# Patient Record
Sex: MALE | Race: WHITE | Employment: OTHER | URBAN - METROPOLITAN AREA
[De-identification: names, ages, dates, MRNs, and addresses within clinical notes are randomized per-mention and may not be internally consistent; named-entity substitution may affect disease eponyms.]

---

## 2017-10-13 ENCOUNTER — HOSPITAL ENCOUNTER (INPATIENT)
Age: 52
LOS: 2 days | Discharge: HOME | DRG: 392 | End: 2017-10-15
Attending: EMERGENCY MEDICINE | Admitting: FAMILY MEDICINE
Payer: COMMERCIAL

## 2017-10-13 DIAGNOSIS — K44.9 HIATAL HERNIA: ICD-10-CM

## 2017-10-13 DIAGNOSIS — K57.20 DIVERTICULITIS OF LARGE INTESTINE WITH ABSCESS, UNSPECIFIED BLEEDING STATUS: Primary | ICD-10-CM

## 2017-10-13 DIAGNOSIS — K76.0 HEPATIC STEATOSIS: ICD-10-CM

## 2017-10-13 DIAGNOSIS — R10.9 ABDOMINAL PAIN, UNSPECIFIED ABDOMINAL LOCATION: ICD-10-CM

## 2017-10-13 LAB
ALBUMIN SERPL-MCNC: 3.9 G/DL (ref 3.4–5)
ALBUMIN/GLOB SERPL: 1.3 {RATIO} (ref 0.8–1.7)
ALP SERPL-CCNC: 91 U/L (ref 45–117)
ALT SERPL-CCNC: 24 U/L (ref 16–61)
ANION GAP SERPL CALC-SCNC: 5 MMOL/L (ref 3–18)
APPEARANCE UR: CLEAR
AST SERPL-CCNC: 16 U/L (ref 15–37)
BASOPHILS # BLD: 0.1 K/UL (ref 0–0.06)
BASOPHILS NFR BLD: 1 % (ref 0–2)
BILIRUB DIRECT SERPL-MCNC: 0.1 MG/DL (ref 0–0.2)
BILIRUB SERPL-MCNC: 0.4 MG/DL (ref 0.2–1)
BILIRUB UR QL: NEGATIVE
BUN SERPL-MCNC: 11 MG/DL (ref 7–18)
BUN/CREAT SERPL: 11 (ref 12–20)
CALCIUM SERPL-MCNC: 9.4 MG/DL (ref 8.5–10.1)
CHLORIDE SERPL-SCNC: 103 MMOL/L (ref 100–108)
CO2 SERPL-SCNC: 29 MMOL/L (ref 21–32)
COLOR UR: YELLOW
CREAT SERPL-MCNC: 1 MG/DL (ref 0.6–1.3)
DIFFERENTIAL METHOD BLD: ABNORMAL
EOSINOPHIL # BLD: 0.2 K/UL (ref 0–0.4)
EOSINOPHIL NFR BLD: 2 % (ref 0–5)
ERYTHROCYTE [DISTWIDTH] IN BLOOD BY AUTOMATED COUNT: 12.1 % (ref 11.6–14.5)
GLOBULIN SER CALC-MCNC: 3 G/DL (ref 2–4)
GLUCOSE SERPL-MCNC: 114 MG/DL (ref 74–99)
GLUCOSE UR STRIP.AUTO-MCNC: NEGATIVE MG/DL
HCT VFR BLD AUTO: 39.4 % (ref 36–48)
HGB BLD-MCNC: 13.7 G/DL (ref 13–16)
HGB UR QL STRIP: NEGATIVE
KETONES UR QL STRIP.AUTO: NEGATIVE MG/DL
LEUKOCYTE ESTERASE UR QL STRIP.AUTO: NEGATIVE
LIPASE SERPL-CCNC: 74 U/L (ref 73–393)
LYMPHOCYTES # BLD: 2.1 K/UL (ref 0.9–3.6)
LYMPHOCYTES NFR BLD: 26 % (ref 21–52)
MCH RBC QN AUTO: 31.8 PG (ref 24–34)
MCHC RBC AUTO-ENTMCNC: 34.8 G/DL (ref 31–37)
MCV RBC AUTO: 91.4 FL (ref 74–97)
MONOCYTES # BLD: 0.7 K/UL (ref 0.05–1.2)
MONOCYTES NFR BLD: 9 % (ref 3–10)
NEUTS SEG # BLD: 5 K/UL (ref 1.8–8)
NEUTS SEG NFR BLD: 62 % (ref 40–73)
NITRITE UR QL STRIP.AUTO: NEGATIVE
PH UR STRIP: 6 [PH] (ref 5–8)
PLATELET # BLD AUTO: 219 K/UL (ref 135–420)
PMV BLD AUTO: 10.1 FL (ref 9.2–11.8)
POTASSIUM SERPL-SCNC: 3.7 MMOL/L (ref 3.5–5.5)
PROT SERPL-MCNC: 6.9 G/DL (ref 6.4–8.2)
PROT UR STRIP-MCNC: NEGATIVE MG/DL
RBC # BLD AUTO: 4.31 M/UL (ref 4.7–5.5)
SODIUM SERPL-SCNC: 137 MMOL/L (ref 136–145)
SP GR UR REFRACTOMETRY: 1.02 (ref 1–1.03)
UROBILINOGEN UR QL STRIP.AUTO: 0.2 EU/DL (ref 0.2–1)
WBC # BLD AUTO: 8.1 K/UL (ref 4.6–13.2)

## 2017-10-13 PROCEDURE — 94761 N-INVAS EAR/PLS OXIMETRY MLT: CPT

## 2017-10-13 PROCEDURE — 87040 BLOOD CULTURE FOR BACTERIA: CPT | Performed by: NURSE PRACTITIONER

## 2017-10-13 PROCEDURE — 74011250636 HC RX REV CODE- 250/636: Performed by: NURSE PRACTITIONER

## 2017-10-13 PROCEDURE — 74011000258 HC RX REV CODE- 258: Performed by: NURSE PRACTITIONER

## 2017-10-13 PROCEDURE — 74011250636 HC RX REV CODE- 250/636: Performed by: FAMILY MEDICINE

## 2017-10-13 PROCEDURE — 80048 BASIC METABOLIC PNL TOTAL CA: CPT | Performed by: NURSE PRACTITIONER

## 2017-10-13 PROCEDURE — 85025 COMPLETE CBC W/AUTO DIFF WBC: CPT | Performed by: NURSE PRACTITIONER

## 2017-10-13 PROCEDURE — 83690 ASSAY OF LIPASE: CPT | Performed by: NURSE PRACTITIONER

## 2017-10-13 PROCEDURE — 65270000029 HC RM PRIVATE

## 2017-10-13 PROCEDURE — 81003 URINALYSIS AUTO W/O SCOPE: CPT | Performed by: NURSE PRACTITIONER

## 2017-10-13 PROCEDURE — 99284 EMERGENCY DEPT VISIT MOD MDM: CPT

## 2017-10-13 PROCEDURE — 80076 HEPATIC FUNCTION PANEL: CPT | Performed by: NURSE PRACTITIONER

## 2017-10-13 PROCEDURE — 96374 THER/PROPH/DIAG INJ IV PUSH: CPT

## 2017-10-13 RX ORDER — SODIUM CHLORIDE 9 MG/ML
125 INJECTION, SOLUTION INTRAVENOUS CONTINUOUS
Status: DISCONTINUED | OUTPATIENT
Start: 2017-10-13 | End: 2017-10-13

## 2017-10-13 RX ORDER — SODIUM CHLORIDE 9 MG/ML
100 INJECTION, SOLUTION INTRAVENOUS CONTINUOUS
Status: DISCONTINUED | OUTPATIENT
Start: 2017-10-13 | End: 2017-10-15 | Stop reason: HOSPADM

## 2017-10-13 RX ORDER — HEPARIN SODIUM 5000 [USP'U]/ML
5000 INJECTION, SOLUTION INTRAVENOUS; SUBCUTANEOUS EVERY 8 HOURS
Status: DISCONTINUED | OUTPATIENT
Start: 2017-10-13 | End: 2017-10-15 | Stop reason: HOSPADM

## 2017-10-13 RX ORDER — SODIUM CHLORIDE 0.9 % (FLUSH) 0.9 %
5-10 SYRINGE (ML) INJECTION AS NEEDED
Status: DISCONTINUED | OUTPATIENT
Start: 2017-10-13 | End: 2017-10-15 | Stop reason: HOSPADM

## 2017-10-13 RX ORDER — MORPHINE SULFATE 2 MG/ML
2 INJECTION, SOLUTION INTRAMUSCULAR; INTRAVENOUS
Status: DISCONTINUED | OUTPATIENT
Start: 2017-10-13 | End: 2017-10-15 | Stop reason: HOSPADM

## 2017-10-13 RX ORDER — SODIUM CHLORIDE 0.9 % (FLUSH) 0.9 %
5-10 SYRINGE (ML) INJECTION EVERY 8 HOURS
Status: DISCONTINUED | OUTPATIENT
Start: 2017-10-13 | End: 2017-10-13

## 2017-10-13 RX ORDER — ONDANSETRON 2 MG/ML
4 INJECTION INTRAMUSCULAR; INTRAVENOUS
Status: DISCONTINUED | OUTPATIENT
Start: 2017-10-13 | End: 2017-10-15 | Stop reason: HOSPADM

## 2017-10-13 RX ADMIN — SODIUM CHLORIDE 125 ML/HR: 900 INJECTION, SOLUTION INTRAVENOUS at 21:05

## 2017-10-13 RX ADMIN — PIPERACILLIN SODIUM,TAZOBACTAM SODIUM 3.38 G: 3; .375 INJECTION, POWDER, FOR SOLUTION INTRAVENOUS at 19:32

## 2017-10-13 RX ADMIN — SODIUM CHLORIDE 100 ML/HR: 900 INJECTION, SOLUTION INTRAVENOUS at 22:00

## 2017-10-13 NOTE — Clinical Note
Status[de-identified] Inpatient [101] Type of Bed: Medical [8] Inpatient Hospitalization Certified Necessary for the Following Reasons: 3. Patient receiving treatment that can only be provided in an inpatient setting (further clarification in H&P documentation) Admitting Diagnosis: Diverticulitis of large intestine with abscess without bleeding [[id number]] Admitting Physician: Wander Pineda Attending Physician: Wander Pineda Estimated Length of Stay: 2 Midnights Discharge Plan[de-identified] Home with Office Follow-up

## 2017-10-13 NOTE — ED PROVIDER NOTES
HPI Comments: `Alec Marques is a 47year old male who was sent to the ED for admission by Dr Douglas Corbin. The Pt was seen by Dr Douglas Corbin yesterday, and had a CT of the abdomen done on MRI and CT diagnostics of [place], [place]. Dr Douglas Corbin called him and advised the Pt to come to the ER for admission and IV ABX. Dr Douglas Corbin also spoke with Dr Rosalia Cristobal, advising him that that the Pt has diverticulitis, and a small abscess. He requested the Pt be admitted through the hospital service. Patient is a 47 y.o. male presenting with abdominal pain. The history is provided by the patient, medical records and the spouse. History limited by: No communication barrier. Abdominal Pain    This is a new problem. Episode onset: Sinday Oct 08, 2017. The problem occurs constantly. The problem has not changed since onset. Associated with: Pt makes an association with eating peanuts. The pain is located in the generalized abdominal region. The pain is mild. Nothing worsens the pain. The pain is relieved by nothing. History reviewed. No pertinent past medical history. History reviewed. No pertinent surgical history. History reviewed. No pertinent family history. Social History     Social History    Marital status:      Spouse name: N/A    Number of children: N/A    Years of education: N/A     Occupational History    Not on file. Social History Main Topics    Smoking status: Not on file    Smokeless tobacco: Not on file    Alcohol use Not on file    Drug use: Not on file    Sexual activity: Not on file     Other Topics Concern    Not on file     Social History Narrative    No narrative on file         ALLERGIES: Review of patient's allergies indicates no known allergies. Review of Systems   Constitutional: Negative. HENT: Negative. Eyes: Negative. Respiratory: Negative. Cardiovascular: Negative. Gastrointestinal: Positive for abdominal pain. Endocrine: Negative.     Genitourinary: Negative. Musculoskeletal: Negative. Skin: Negative. Allergic/Immunologic: Negative. Neurological: Negative. Psychiatric/Behavioral: Negative. Vitals:    10/13/17 1824 10/13/17 1830   BP: 110/69 121/72   Pulse:  67   Resp:  17   SpO2: 94% 96%            Physical Exam   Constitutional: He is oriented to person, place, and time. He appears well-developed and well-nourished. No distress. HENT:   Head: Normocephalic and atraumatic. Eyes: Pupils are equal, round, and reactive to light. Neck: Normal range of motion. Neck supple. Cardiovascular: Normal rate, regular rhythm, normal heart sounds and intact distal pulses. Pulmonary/Chest: Effort normal and breath sounds normal. He has no wheezes. He has no rales. Abdominal: Soft. Bowel sounds are normal. He exhibits distension. There is no tenderness. There is no rebound and no guarding. Genitourinary:   Genitourinary Comments: NE   Musculoskeletal: Normal range of motion. Neurological: He is alert and oriented to person, place, and time. No cranial nerve deficit. He exhibits normal muscle tone. Coordination normal.   Skin: Skin is warm and dry. Psychiatric: He has a normal mood and affect. Nursing note and vitals reviewed. MDM  Number of Diagnoses or Management Options  Abdominal pain, unspecified abdominal location:   Diverticulitis of large intestine with abscess, unspecified bleeding status:   Hepatic steatosis:   Hiatal hernia:      Amount and/or Complexity of Data Reviewed  Clinical lab tests: ordered and reviewed  Review and summarize past medical records: yes  Discuss the patient with other providers: yes (Dr Maricruz Shaikh, Dr Neyda Sunshine, Dr Magaly Rooney.  )    Risk of Complications, Morbidity, and/or Mortality  Presenting problems: moderate  Diagnostic procedures: moderate  Management options: moderate    Patient Progress  Patient progress: stable    ED Course       Procedures     CONSULT:  Discussed the Pt with Dr Neyda Sunshine.   She advised admission and requested she be placed on the consulting service. Spoke with Dr Traci Mata, who accepted the Pt for admission. Lissett Pierce NP   8:11 PM    Diagnosis:   1. Diverticulitis of large intestine with abscess, unspecified bleeding status    2. Abdominal pain, unspecified abdominal location    3. Hepatic steatosis    4. Hiatal hernia          Disposition:   ADMITTED - DR DEJESUS.      Follow-up Information     None          Patient's Medications    No medications on file

## 2017-10-13 NOTE — IP AVS SNAPSHOT
[address] [address] 
[phone] Patient: Vinod Wells MRN: [id number] :1965 Current Discharge Medication List  
  
START taking these medications Dose & Instructions Dispensing Information Comments Morning Noon Evening Bedtime  
 ciprofloxacin HCl 500 mg tablet Commonly known as:  CIPRO Your last dose was: Your next dose is:    
   
   
 Dose:  500 mg Take 1 Tab by mouth two (2) times a day for 10 days. Quantity:  20 Tab Refills:  0  
     
   
   
   
  
 metroNIDAZOLE 500 mg tablet Commonly known as:  FLAGYL Your last dose was: Your next dose is:    
   
   
 Dose:  500 mg Take 1 Tab by mouth two (2) times a day for 10 days. Quantity:  20 Tab Refills:  0 Where to Get Your Medications Information on where to get these meds will be given to you by the nurse or doctor. ! Ask your nurse or doctor about these medications  
  ciprofloxacin HCl 500 mg tablet  
 metroNIDAZOLE 500 mg tablet

## 2017-10-13 NOTE — IP AVS SNAPSHOT
[address] [place] [address] 
[phone] Patient: Kendy Meyers MRN: [id number] :1965 You are allergic to the following No active allergies Immunizations Administered for This Admission Name Date Influenza Vaccine  Deferred (Patient Refused) Recent Documentation Height Weight BMI  
  
  
 1.803 m 103.3 kg 31.77 kg/m2 Unresulted Labs Order Current Status CULTURE, BLOOD Preliminary result CULTURE, BLOOD Preliminary result Emergency Contacts Name Discharge Info Relation Home Work Mobile Magda Little DISCHARGE CAREGIVER [3] Spouse [3]   [phone] About your hospitalization You were admitted on:  2017 You last received care in the:  [facility] 2 NEURO MED You were discharged on:  October 15, 2017 Unit phone number:  [phone] Why you were hospitalized Your primary diagnosis was:  Diverticulitis Of Large Intestine With Abscess Without Bleeding Providers Seen During Your Hospitalizations Provider Role Specialty Primary office phone Alisson Chao MD Attending Provider Emergency Medicine [phone] Pat Batista MD Attending Provider [facility] [phone] Jes Smith DO Attending Provider Internal Medicine [phone] Your Primary Care Physician (PCP) Primary Care Physician Office Phone Office Fax [place] [phone] Follow-up Information Follow up With Details Comments Contact Info Additional Information Crispin Gifford MD Schedule an appointment as soon as possible for a visit CT abd 10/19. Colonoscopy 6-8 weeks [address] [address] [phone] Tonya Tian MD   [address] 
[phone] [facility] RADIOLOGY   [address] [phone]  CHECK IN INSTRUCTIONS  For Diagnostic Radiology/XRAY exams:  Check in to Patient Access at the [address] 30 minutes prior to your appointment. NOTE:  FREE  Parking is available at the [address] [place] [address]                                                                     [facility]ish[facility] Row[facility] Current Discharge Medication List  
  
START taking these medications Dose & Instructions Dispensing Information Comments Morning Noon Evening Bedtime  
 ciprofloxacin HCl 500 mg tablet Commonly known as:  CIPRO Your last dose was: Your next dose is:    
   
   
 Dose:  500 mg Take 1 Tab by mouth two (2) times a day for 10 days. Quantity:  20 Tab Refills:  0  
     
   
   
   
  
 metroNIDAZOLE 500 mg tablet Commonly known as:  FLAGYL Your last dose was: Your next dose is:    
   
   
 Dose:  500 mg Take 1 Tab by mouth two (2) times a day for 10 days. Quantity:  20 Tab Refills:  0 Where to Get Your Medications Information on where to get these meds will be given to you by the nurse or doctor. ! Ask your nurse or doctor about these medications  
  ciprofloxacin HCl 500 mg tablet  
 metroNIDAZOLE 500 mg tablet Discharge Instructions Patient armband removed and shredded. DISCHARGE SUMMARY from Nurse The following personal items are in your possession at time of discharge: 
 
Dental Appliances: None Visual Aid: None Home Medications: None Jewelry: None Clothing: Shirt Other Valuables: Cell Phone PATIENT INSTRUCTIONS: 
 
 
F-face looks uneven A-arms unable to move or move unevenly S-speech slurred or non-existent T-time-call 911 as soon as signs and symptoms begin-DO NOT go Back to bed or wait to see if you get better-TIME IS BRAIN. Warning Signs of HEART ATTACK Call 911 if you have these symptoms: 
? Chest discomfort. Most heart attacks involve discomfort in the center of the chest that lasts more than a few minutes, or that goes away and comes back. It can feel like uncomfortable pressure, squeezing, fullness, or pain. ? Discomfort in other areas of the upper body. Symptoms can include pain or discomfort in one or both arms, the back, neck, jaw, or stomach. ? Shortness of breath with or without chest discomfort. ? Other signs may include breaking out in a cold sweat, nausea, or lightheadedness. Don't wait more than five minutes to call [address]! Fast action can save your life. Calling 911 is almost always the fastest way to get lifesaving treatment. Emergency Medical Services staff can begin treatment when they arrive  up to an hour sooner than if someone gets to the hospital by car. The discharge information has been reviewed with the patient. The patient verbalized understanding. Discharge medications reviewed with the patient and appropriate educational materials and side effects teaching were provided. Learning About Diverticulosis and Diverticulitis What are diverticulosis and diverticulitis? In diverticulosis and diverticulitis, pouches called diverticula form in the wall of the large intestine, or colon. · In diverticulosis, the pouches do not cause any pain or other symptoms. · In diverticulitis, the pouches get inflamed or infected and cause symptoms. Doctors aren't sure what causes these pouches in the colon. But they think that a low-fiber diet may play a role. Without fiber to add bulk to the stool, the colon has to work harder than normal to push the stool forward. The pressure from this may cause pouches to form in weak spots along the colon. Some people with diverticulosis get diverticulitis. But experts don't know why this happens. What are the symptoms? · In diverticulosis, most people don't have symptoms. But pouches sometimes bleed. · In diverticulitis, symptoms may last from a few hours to a week or more. They include: ¨ Belly pain. This is usually in the lower left side. It is sometimes worse when you move. This is the most common symptom. ¨ Fever and chills. ¨ Bloating and gas. ¨ Diarrhea or constipation. ¨ Nausea and sometimes vomiting. ¨ Not feeling like eating. How can you prevent these problems? You may be able to lower your chance of getting diverticulitis. You can do this by taking steps to prevent constipation. · Eat fruits, vegetables, beans, and whole grains every day. These foods are high in fiber. · Drink plenty of fluids (enough so that your urine is light yellow or clear like water). If you have kidney, heart, or liver disease and have to limit fluids, talk with your doctor before you increase the amount of fluids you drink. · Get at least 30 minutes of exercise on most days of the week. Walking is a good choice. You also may want to do other activities, such as running, swimming, cycling, or playing tennis or team sports. · Take a fiber supplement, such as Citrucel or Metamucil, every day if needed. Read and follow all instructions on the label. · Schedule time each day for a bowel movement. Having a daily routine may help. Take your time and do not strain when having a bowel movement. Some people avoid nuts, seeds, berries, and popcorn.  They believe that these foods might get trapped in the diverticula and cause pain. But there is no proof that these foods cause diverticulitis or make it worse. How are these problems treated? · The best way to treat diverticulosis is to avoid constipation. (See the tips above.) · Treatment for diverticulitis includes antibiotics and often a change in your diet. You may need only liquids at first. Your doctor may suggest pain medicines for pain or belly cramps. In some cases, surgery may be needed. Follow-up care is a key part of your treatment and safety. Be sure to make and go to all appointments, and call your doctor if you are having problems. It's also a good idea to know your test results and keep a list of the medicines you take. Where can you learn more? Go to http://geoff-efrem.info/. Enter K539 in the search box to learn more about \"Learning About Diverticulosis and Diverticulitis. \" Current as of: August 9, 2016 Content Version: 11.3 © 2407-8152 [employer]. Care instructions adapted under license by iodine (which disclaims liability or warranty for this information). If you have questions about a medical condition or this instruction, always ask your healthcare professional. [place] [address] any warranty or liability for your use of this information. [employer] Activation Thank you for requesting access to [employer]. Please follow the instructions below to securely access and download your online medical record. [employer] allows you to send messages to your doctor, view your test results, renew your prescriptions, schedule appointments, and more. How Do I Sign Up? 1. In your internet browser, go to [email or web address] 
2. Click on the First Time User? Click Here link in the Sign In box. You will be redirect to the New Member Sign Up page. 3. Enter your [employer] Access Code exactly as it appears below.  You will not need to use this code after youve completed the sign-up process. If you do not sign up before the expiration date, you must request a new code. [facility] Access Code: [email or web address] Expires: 2018  8:05 PM (This is the date your [facility] access code will ) 4. Enter the last four digits of your Social Security Number (xxxx) and Date of Birth (mm/dd/yyyy) as indicated and click Submit. You will be taken to the next sign-up page. 5. Create a [facility] ID. This will be your [facility] login ID and cannot be changed, so think of one that is secure and easy to remember. 6. Create a [facility] password. You can change your password at any time. 7. Enter your Password Reset Question and Answer. This can be used at a later time if you forget your password. 8. Enter your e-mail address. You will receive e-mail notification when new information is available in [address] 9. Click Sign Up. You can now view and download portions of your medical record. 10. Click the Download Summary menu link to download a portable copy of your medical information. Additional Information If you have questions, please visit the Frequently Asked Questions section of the [employer] website at [email or web address] Remember, [facility] is NOT to be used for urgent needs. For medical emergencies, dial 911. Discharge Orders Procedure Order Date Status Priority Quantity Spec Type Associated Dx CT ABD PELV W WO CONT 10/15/17 7525 Future Routine 1 Questions: Is Patient Allergic to Contrast Dye?:  Unknown Stat POC Creatinine as needed for Radiology Policy:  No - Cr .9 on 10/15 [facility] Announcement We are excited to announce that we are making your provider's discharge notes available to you in [facility].   You will see these notes when they are completed and signed by the physician that discharged you from your recent hospital stay. If you have any questions or concerns about any information you see in [facility], please call the Health Information Department where you were seen or reach out to your Primary Care Provider for more information about your plan of care. Introducing \[facility] [place] [facility]\"" & HEALTH SERVICES! Kusum Couch introduces [facility] patient portal. Now you can access parts of your medical record, email your doctor's office, and request medication refills online. 1. In your internet browser, go to [email or web address] 2. Click on the First Time User? Click Here link in the Sign In box. You will see the New Member Sign Up page. 3. Enter your [facility] Access Code exactly as it appears below. You will not need to use this code after youve completed the sign-up process. If you do not sign up before the expiration date, you must request a new code. · [facility] Access Code: [email or web address] Expires: 1/11/2018  8:05 PM 
 
4. Enter the last four digits of your Social Security Number (xxxx) and Date of Birth (mm/dd/yyyy) as indicated and click Submit. You will be taken to the next sign-up page. 5. Create a [facility]ego ID. This will be your [facility] login ID and cannot be changed, so think of one that is secure and easy to remember. 6. Create a [facility]ego password. You can change your password at any time. 7. Enter your Password Reset Question and Answer. This can be used at a later time if you forget your password. 8. Enter your e-mail address. You will receive e-mail notification when new information is available in [address] 9. Click Sign Up. You can now view and download portions of your medical record. 10. Click the Download Summary menu link to download a portable copy of your medical information. If you have questions, please visit the Frequently Asked Questions section of the [employer] website. Remember, [facility] is NOT to be used for urgent needs. For medical emergencies, dial 911. Now available from your iPhone and Android! General Information Please provide this summary of care documentation to your next provider. Patient Signature:  ____________________________________________________________ Date:  ____________________________________________________________  
  
Macy Charter Provider Signature:  ____________________________________________________________ Date:  ____________________________________________________________

## 2017-10-13 NOTE — H&P
History and Physical    Patient: Manoj Wright               Sex: male          DOA: 10/13/2017       YOB: 1965      Age:  47 y.o.        LOS:  LOS: 0 days        Chief Complaint   Patient presents with    Abdominal Pain         HPI:     Manoj Wright is a 47 y.o. male who presents with abdominal pain onset 5 days ago. The pain was in the deep in the lower abdominal quadrants. He initially thought it was diet related he had a meal of nuts, however at night he became diaphoretic with chills. Next day he was constipated and 2 days later her was seen at [facility] for same problem. He was given Augmentin po 875 mg bid and advised to f/u with GI. He saw Dr Nelly Luong yesterday and he sent him to have a CT abdomen and pelvis with contrast. CT report was read as diverticulitis with tano diverticular abscess. In the ED he is afebrile, denies nausea , vomiting. He has mild abdominal pain 1/10. He had 2 loose stools today non bloody. He will be admitted as requested by Dr Nelly Luong. He was started on IV Zosyn q6. History reviewed. No pertinent past medical history. Antonina Desai History reviewed. No pertinent surgical history. No current facility-administered medications on file prior to encounter. No current outpatient prescriptions on file prior to encounter. Social History     Social History    Marital status:      Spouse name: N/A    Number of children: N/A    Years of education: N/A     Occupational History    Not on file. Social History Main Topics    Smoking status: Not on file    Smokeless tobacco: Not on file    Alcohol use Not on file    Drug use: Not on file    Sexual activity: Not on file     Other Topics Concern    Not on file     Social History Narrative    No narrative on file       None       History reviewed. No pertinent family history. No Known Allergies    Review of Systems   Constitutional: Negative for chills and fever. HENT: Negative. Eyes: Negative. Respiratory: Negative. Cardiovascular: Negative. Gastrointestinal: Positive for abdominal pain. Negative for blood in stool, nausea and vomiting. Genitourinary: Negative. Musculoskeletal: Negative. Skin: Negative. Neurological: Negative. Endo/Heme/Allergies: Negative. Psychiatric/Behavioral: Negative. Physical Exam:       Visit Vitals    /70    Pulse (!) 58    Temp 98.7 °F (37.1 °C)    Resp 14    SpO2 97%       Physical Exam   Constitutional: He is oriented to person, place, and time. He appears well-developed and well-nourished. No distress. HENT:   Head: Normocephalic and atraumatic. Eyes: No scleral icterus. Neck: Neck supple. Cardiovascular: Normal rate, regular rhythm and normal heart sounds. No murmur heard. Pulmonary/Chest: Effort normal and breath sounds normal.   Abdominal: Soft. Bowel sounds are normal. He exhibits distension. There is no tenderness. There is no rebound and no guarding. Musculoskeletal: He exhibits no edema. Neurological: He is alert and oriented to person, place, and time. Skin: Skin is warm. No rash noted. He is not diaphoretic. No erythema. No pallor. Psychiatric: He has a normal mood and affect. Ancillary Studies: All lab and imaging reviewed for the past 24 hours. Recent Results (from the past 24 hour(s))   CBC WITH AUTOMATED DIFF    Collection Time: 10/13/17  6:55 PM   Result Value Ref Range    WBC 8.1 4.6 - 13.2 K/uL    RBC 4.31 (L) 4.70 - 5.50 M/uL    HGB 13.7 13.0 - 16.0 g/dL    HCT 39.4 36.0 - 48.0 %    MCV 91.4 74.0 - 97.0 FL    MCH 31.8 24.0 - 34.0 PG    MCHC 34.8 31.0 - 37.0 g/dL    RDW 12.1 11.6 - 14.5 %    PLATELET 861 072 - 495 K/uL    MPV 10.1 9.2 - 11.8 FL    NEUTROPHILS 62 40 - 73 %    LYMPHOCYTES 26 21 - 52 %    MONOCYTES 9 3 - 10 %    EOSINOPHILS 2 0 - 5 %    BASOPHILS 1 0 - 2 %    ABS. NEUTROPHILS 5.0 1.8 - 8.0 K/UL    ABS. LYMPHOCYTES 2.1 0.9 - 3.6 K/UL    ABS.  MONOCYTES 0.7 0.05 - 1.2 K/UL ABS. EOSINOPHILS 0.2 0.0 - 0.4 K/UL    ABS. BASOPHILS 0.1 (H) 0.0 - 0.06 K/UL    DF AUTOMATED     METABOLIC PANEL, BASIC    Collection Time: 10/13/17  6:55 PM   Result Value Ref Range    Sodium 137 136 - 145 mmol/L    Potassium 3.7 3.5 - 5.5 mmol/L    Chloride 103 100 - 108 mmol/L    CO2 29 21 - 32 mmol/L    Anion gap 5 3.0 - 18 mmol/L    Glucose 114 (H) 74 - 99 mg/dL    BUN 11 7.0 - 18 MG/DL    Creatinine 1.00 0.6 - 1.3 MG/DL    BUN/Creatinine ratio 11 (L) 12 - 20      GFR est AA >60 >60 ml/min/1.73m2    GFR est non-AA >60 >60 ml/min/1.73m2    Calcium 9.4 8.5 - 10.1 MG/DL   HEPATIC FUNCTION PANEL    Collection Time: 10/13/17  6:55 PM   Result Value Ref Range    Protein, total 6.9 6.4 - 8.2 g/dL    Albumin 3.9 3.4 - 5.0 g/dL    Globulin 3.0 2.0 - 4.0 g/dL    A-G Ratio 1.3 0.8 - 1.7      Bilirubin, total 0.4 0.2 - 1.0 MG/DL    Bilirubin, direct 0.1 0.0 - 0.2 MG/DL    Alk. phosphatase 91 45 - 117 U/L    AST (SGOT) 16 15 - 37 U/L    ALT (SGPT) 24 16 - 61 U/L   LIPASE    Collection Time: 10/13/17  6:55 PM   Result Value Ref Range    Lipase 74 73 - 393 U/L   URINALYSIS W/ RFLX MICROSCOPIC    Collection Time: 10/13/17  7:10 PM   Result Value Ref Range    Color YELLOW      Appearance CLEAR      Specific gravity 1.016 1.005 - 1.030      pH (UA) 6.0 5.0 - 8.0      Protein NEGATIVE  NEG mg/dL    Glucose NEGATIVE  NEG mg/dL    Ketone NEGATIVE  NEG mg/dL    Bilirubin NEGATIVE  NEG      Blood NEGATIVE  NEG      Urobilinogen 0.2 0.2 - 1.0 EU/dL    Nitrites NEGATIVE  NEG      Leukocyte Esterase NEGATIVE  NEG         Assessment/Plan     Principal Problem:    Diverticulitis of large intestine with abscess without bleeding (10/13/2017)      Sinus Bradycardia     PLAN:    Diverticulitis with abscess - CT abdomen reviewed. Continue Zosyn , IVF and pain control as needed. Patient is afebrile . Continue medical management. May need drainage percutaneously  if not improving  Plan to advance diet to clears tomorrow. Dr Joselito Wang following. Sinus Bradycardia -a symptomatic.  Monitor    DVT Prophylaxis     Code status : full       Phoebe Bell MD  10/13/2017  7:37 PM

## 2017-10-13 NOTE — ED TRIAGE NOTES
\"I have stomach pain and I've been on antibiotics since Tuesday. DR Rohit Melvin told me to come here to be admitted. \"

## 2017-10-14 LAB
ANION GAP SERPL CALC-SCNC: 8 MMOL/L (ref 3–18)
BASOPHILS # BLD: 0.1 K/UL (ref 0–0.06)
BASOPHILS NFR BLD: 1 % (ref 0–2)
BUN SERPL-MCNC: 11 MG/DL (ref 7–18)
BUN/CREAT SERPL: 11 (ref 12–20)
CALCIUM SERPL-MCNC: 9.1 MG/DL (ref 8.5–10.1)
CHLORIDE SERPL-SCNC: 106 MMOL/L (ref 100–108)
CO2 SERPL-SCNC: 26 MMOL/L (ref 21–32)
CREAT SERPL-MCNC: 0.98 MG/DL (ref 0.6–1.3)
DIFFERENTIAL METHOD BLD: ABNORMAL
EOSINOPHIL # BLD: 0.2 K/UL (ref 0–0.4)
EOSINOPHIL NFR BLD: 3 % (ref 0–5)
ERYTHROCYTE [DISTWIDTH] IN BLOOD BY AUTOMATED COUNT: 12.3 % (ref 11.6–14.5)
GLUCOSE SERPL-MCNC: 96 MG/DL (ref 74–99)
HCT VFR BLD AUTO: 38.4 % (ref 36–48)
HGB BLD-MCNC: 13 G/DL (ref 13–16)
INR PPP: 1.1 (ref 0.8–1.2)
LYMPHOCYTES # BLD: 1.5 K/UL (ref 0.9–3.6)
LYMPHOCYTES NFR BLD: 19 % (ref 21–52)
MCH RBC QN AUTO: 31.1 PG (ref 24–34)
MCHC RBC AUTO-ENTMCNC: 33.9 G/DL (ref 31–37)
MCV RBC AUTO: 91.9 FL (ref 74–97)
MONOCYTES # BLD: 0.7 K/UL (ref 0.05–1.2)
MONOCYTES NFR BLD: 9 % (ref 3–10)
NEUTS SEG # BLD: 5.7 K/UL (ref 1.8–8)
NEUTS SEG NFR BLD: 68 % (ref 40–73)
PLATELET # BLD AUTO: 200 K/UL (ref 135–420)
PMV BLD AUTO: 10.1 FL (ref 9.2–11.8)
POTASSIUM SERPL-SCNC: 4 MMOL/L (ref 3.5–5.5)
PROTHROMBIN TIME: 13.2 SEC (ref 11.5–15.2)
RBC # BLD AUTO: 4.18 M/UL (ref 4.7–5.5)
SODIUM SERPL-SCNC: 140 MMOL/L (ref 136–145)
WBC # BLD AUTO: 8.2 K/UL (ref 4.6–13.2)

## 2017-10-14 PROCEDURE — 80048 BASIC METABOLIC PNL TOTAL CA: CPT | Performed by: FAMILY MEDICINE

## 2017-10-14 PROCEDURE — 85025 COMPLETE CBC W/AUTO DIFF WBC: CPT | Performed by: FAMILY MEDICINE

## 2017-10-14 PROCEDURE — 36415 COLL VENOUS BLD VENIPUNCTURE: CPT | Performed by: FAMILY MEDICINE

## 2017-10-14 PROCEDURE — 65270000029 HC RM PRIVATE

## 2017-10-14 PROCEDURE — 74011000258 HC RX REV CODE- 258: Performed by: FAMILY MEDICINE

## 2017-10-14 PROCEDURE — 74011250636 HC RX REV CODE- 250/636: Performed by: FAMILY MEDICINE

## 2017-10-14 PROCEDURE — 85610 PROTHROMBIN TIME: CPT | Performed by: FAMILY MEDICINE

## 2017-10-14 RX ADMIN — HEPARIN SODIUM 5000 UNITS: 5000 INJECTION, SOLUTION INTRAVENOUS; SUBCUTANEOUS at 00:03

## 2017-10-14 RX ADMIN — HEPARIN SODIUM 5000 UNITS: 5000 INJECTION, SOLUTION INTRAVENOUS; SUBCUTANEOUS at 06:53

## 2017-10-14 RX ADMIN — PIPERACILLIN SODIUM,TAZOBACTAM SODIUM 4.5 G: 4; .5 INJECTION, POWDER, FOR SOLUTION INTRAVENOUS at 06:54

## 2017-10-14 RX ADMIN — HEPARIN SODIUM 5000 UNITS: 5000 INJECTION, SOLUTION INTRAVENOUS; SUBCUTANEOUS at 22:34

## 2017-10-14 RX ADMIN — PIPERACILLIN SODIUM,TAZOBACTAM SODIUM 4.5 G: 4; .5 INJECTION, POWDER, FOR SOLUTION INTRAVENOUS at 15:55

## 2017-10-14 RX ADMIN — PIPERACILLIN SODIUM,TAZOBACTAM SODIUM 4.5 G: 4; .5 INJECTION, POWDER, FOR SOLUTION INTRAVENOUS at 00:04

## 2017-10-14 RX ADMIN — SODIUM CHLORIDE 100 ML/HR: 900 INJECTION, SOLUTION INTRAVENOUS at 22:33

## 2017-10-14 RX ADMIN — HEPARIN SODIUM 5000 UNITS: 5000 INJECTION, SOLUTION INTRAVENOUS; SUBCUTANEOUS at 15:23

## 2017-10-14 RX ADMIN — PIPERACILLIN SODIUM,TAZOBACTAM SODIUM 4.5 G: 4; .5 INJECTION, POWDER, FOR SOLUTION INTRAVENOUS at 22:34

## 2017-10-14 NOTE — PROGRESS NOTES
[facility] Dosing Services     Pharmacy adjusting dose for Piperacillin-Tazobactam (Zosyn) per renal and extended infusion protocol. Was on a regimen of: 4.5 gm IV q6h    Labs:   Serum Creatinine/CrCl: pending    Plan:  Changed Zosyn to 4.5 gm IV q8h extended 4 hours infusion. Pharmacy to follow and will redose based on renal function changes. Thanks.

## 2017-10-14 NOTE — PROGRESS NOTES
2200: Assumed pt care. Pt is A&Ox4, awake in bed, no signs of distress, instructed to press call light if assistance is needed, call light within reach. BP unable to be taken at this time, cuffs unavailable. Paged Nursing supervisor. Waiting for call back. 0018: BP taken    No complaints of pain all night. Uneventful night.    0725: Bedside and Verbal shift change report given to Electa Brittle, RN (oncoming nurse) by Miguel A Bran RN (offgoing nurse). Report included the following information SBAR, Kardex, Intake/Output, MAR and Recent Results.

## 2017-10-14 NOTE — PROGRESS NOTES
Daily Progress Note: 10/14/2017 8:40 AM   Admit Date: 10/13/2017    Patient seen in follow up for multiple medical problems as listed below:  Patient Active Problem List   Diagnosis Code    Diverticulitis of large intestine with abscess without bleeding K57.20       Assesment     52M with hx of diverticular disease presents with abd pain and fever x5d. Diverticulitis with abscess - Continue Zosyn , IVF and pain control as needed. GI to consult. Planning on non-surgical management. Interval CT abd will be needed  Currently on clears     Sinus Bradycardia -asymptomatic. DVT Protocol Active: yes  Code Status:  Full Code     Disposition: req 24h hosp    Subjective:     CC: Abdominal Pain    Interval History: pain has continued to improve. Sinus 60bpm this am.     ROS: 11 point ROS negative except for    Objective:     Visit Vitals    /74 (BP 1 Location: Left arm, BP Patient Position: Supine)    Pulse 65    Temp 98.1 °F (36.7 °C)    Resp 14    Ht 5' 11\" (1.803 m)    Wt 103.3 kg (227 lb 12.8 oz)    SpO2 92%    BMI 31.77 kg/m2       Temp (24hrs), Av.4 °F (36.9 °C), Min:98.1 °F (36.7 °C), Max:98.7 °F (37.1 °C)        Intake/Output Summary (Last 24 hours) at 10/14/17 0840  Last data filed at 10/14/17 0659   Gross per 24 hour   Intake           898.33 ml   Output                0 ml   Net           898.33 ml       Gen: AOx3, NAD  HEENT:  GRIFFIN, EOMI. Neck: No Bruits/JVD   Lungs:   CTAB. Good respiratory effort  Heart:   RR S1 S2 without M/R/G  Abdomen: ND,mild RLQ tenderness, BSX4,   Extremities:   No LE edema. No cyanosis.   Skin:  no jaundice/lesions      Data Review:     Meds/Labs/Tests reviewed    Current Shift:     Last three shifts:  10/12 1901 - 10/14 0700  In: 898.3 [I.V.:898.3]  Out: -   Recent Labs      10/14/17   0502  10/13/17   1855   WBC  8.2  8.1   RBC  4.18*  4.31*   HGB  13.0  13.7   HCT  38.4  39.4   PLT  200  219   GRANS  68  62   LYMPH  19*  26   EOS  3  2       Recent Labs      10/14/17   0502  10/13/17   1855   BUN  11  11   CREA  0.98  1.00   CA  9.1  9.4   ALB   --   3.9   K  4.0  3.7   NA  140  137   CL  106  103   CO2  26  29   GLU  96  114*        Lab Results   Component Value Date/Time    Glucose 96 10/14/2017 05:02 AM    Glucose 114 10/13/2017 06:55 PM          Care coordination with Nursing/Consultants/staff: 15  Prior history, labs, and charting reviewed: 15    Procedures/Imaging:  Blood Cx x2  CT abd - outside facility      Total time spent with chart review, patient examination/education, discussion with staff on case,documentation and medication management / adjustment  :  30 Minutes      Dr Radha Villgeas  [phone]

## 2017-10-14 NOTE — ED NOTES
Pt c/o pain with infusion of Zosyn, Slowed infusion rate, burning stopped  No signs of reaction or infiltration at site

## 2017-10-14 NOTE — ACP (ADVANCE CARE PLANNING)
Patient has designated ________wife________________ to participate in his/her discharge plan and to receive any needed information.      Name: Rubens Mata  Address:  Phone [phone]

## 2017-10-14 NOTE — CONSULTS
Kali Humphrieso    Name:  Heaven Carvalho  MR#:  [id number]  :  1965  Account #:  [de-identified]  Date of Adm:  10/13/2017  Date of Consultation:  10/14/2017      GASTROENTEROLOGY CONSULTATION    ATTENDING PHYSICIAN: Dr. Metcalf. CONSULTING PHYSICIAN: Ant Ya MD.    REASON FOR CONSULTATION: Acute sigmoid diverticulitis with  peridiverticular abscess. HISTORY OF PRESENT ILLNESS: The patient is a pleasant 55-year-  old  male with no significant past medical history, was  admitted to the hospital by Dr. Riley Fry who is his gastroenterologist  for treatment of acute sigmoid diverticulitis with an abscess. The  patient saw Dr. Riley Fry on 10/12/2017 with complaints of left lower  quadrant pain and lower abdominal pain which started 3-4 days prior to  seeing him. He did report some low-grade fevers and chills. No nausea  or vomiting. No constipation or diarrhea. No blood in the stool. He was  seen at [facility] and was started on Augmentin for presumed  diverticulitis. When he saw Dr. Riley Fry, he was afebrile, but labs  showed mild leukocytosis. He ordered a CT scan of the abdomen and  pelvis, which was done on 10/13/2017 which showed acute sigmoid  diverticulitis with a 15 mm peridiverticular abscess. The patient was  asked to come into the hospital for admission for IV antibiotics. This  morning, the patient feels much better. His pain is much better as well. He just has a low-grade discomfort at this time, he has passed 2 or 3  soft bowel movements today. No nausea, no vomiting. No blood in the  stool, no other complaints at this time. Never had diverticulitis before. He reports having a colonoscopy at age 37 by Dr. Yolanda Bennett. He does have  family history of diverticulitis. REVIEW OF SYSTEMS  As above, all systems are negative. PAST SURGICAL HISTORY: None. ALLERGIES: NO KNOWN DRUG ALLERGIES. [address] [place]  1. IV Zosyn.   2. Heparin subcutaneous. FAMILY HISTORY: significant for diverticulitis, heart disease. No GI  cancers or polyps. SOCIAL HISTORY: He is . He is a podiatrist, denies smoking,  drinking alcohol, or IV drug use. PHYSICAL EXAMINATION  GENERAL: The patient is well-built, well-nourished, appears in no  acute distress at this time. VITAL SIGNS: Temperature is 98.1, pulse is 65, blood pressure is  126/74, saturating 92% on room air. HEENT: Exam reveals no pallor, no icterus, no JVD. LUNGS: Clear to auscultation bilaterally. HEART: Exam revealed S1, S2 heard normally, regular rate and  rhythm. ABDOMEN: Soft, nontender, nondistended. Bowel sounds are present  in all the quadrants. No masses palpated. No guarding or rigidity. No  rebound tenderness felt anywhere. EXTREMITIES: Reveal no pedal edema. NEUROLOGIC: He is alert,  oriented x3 with no focal neurological deficits. LABORATORY DATA: WBC 8.2, hemoglobin is 13.0. MCV 91.9,  platelets are 851. UA is negative. INR 1.1. Sodium 140, potassium 4.0,  chloride 106, bicarbonate is 26, BUN is 11, creatinine 0.98. Total  bilirubin 0.4, albumin 3.9, ALT 24, AST 16, alkaline phosphatase 91. Lipase is 74. Blood cultures are pending. IMPRESSION: Acute sigmoid diverticulitis with 15 mm peridiverticular  abscess. PLAN  1. Continue IV Zosyn at this time. 2. We will start clear liquid diet. 3. The patient will need a repeat CT in 5-6 days to assess the size of  the abscess. If the abscess resolved with antibiotic treatment, he will  not need any further treatment, but if it persists or increases in size, will  need CT-guided drainage. 4. The patient will need a colonoscopy 6-8 weeks later. 5. We will continue to follow the patient. Any further recommendations  will be added to the chart.         MD RENETTA Bolanos / Kelley Schwartz  D:  10/14/2017   10:18  T:  10/14/2017   11:14  Job #:  [id number]

## 2017-10-14 NOTE — PROGRESS NOTES
[facility]   Discharge Planning/ Assessment    Reasons for Intervention:Zakiyat reviewed and patient verified demographics. He has [facility] and Dr Marcos Osullivan is his PCP, last seen 3 to 5 years ago. Pt lives with wife, and there are adult children and an 8 yr old and 6 yr old still at home. Pt is independent with ADL, has not used HH or DME in past. His wife Rene Ying [id number]  Will drive and participate in dc process. Plan is home.      High Risk Criteria  [] Yes  [x]No   Physician Referral  [] Yes  [x]No        Date    Nursing Referral  [] Yes  [x]No        Date    Patient/Family Request  [] Yes  [x]No        Date       Resources:    Medicare  [] Yes  [x]No   Medicaid  [] Yes  [x]No   No Resources  [] Yes  [x]No   Private Insurance  [x] Yes  []No    Name/Phone Number    Other  [] Yes  [x]No        (i.e. Workman's Comp)         Prior Services:    Prior Services  [] Yes  [x]No   Home Health  [] Yes  [x]No   [address]  [] Yes  [x]No        [address] of [address] Program  [] Yes  [x]No       Meals on Wheels  [] Yes  [x]No   Office on Aging  [] Yes  [x]No   Transportation Services  [] Yes  [x]No   Nursing Home  [] Yes  [x]No        Nursing Home Name    [address]  [] Yes  [x]No        [address] Name    Other       Information Source:      Information obtained from  [x] Patient  [] Parent   [] [address]   [] Child  [] Spouse   [] Significant Other/Partner   [] Friend      [] EMS    [] Nursing Home Chart          [] Other:   Chart Review  [x] Yes  []No     Family/Support System:    Patient lives with  [] Alone    [x] Spouse   [] Significant Other  [x] Children  [] Caretaker   [] Parent  [] Sibling     [] Other       Other Support System:    Is the patient responsible for care of others  [x] Yes  []No   Information of person caring for patient on  discharge    Managers financial affairs independently  [x] Yes  []No   If no, explain: Status Prior to Admission:    Mental Status  [x] Awake  [] Alert  [] Oriented  [] Quiet/Calm [] Lethargic/Sedated   [] Disoriented  [] Restless/Anxious  [] Combative   Personal Care  [] Dependent  [x] Independent Personal Care  [] Requires Assistance   Meal Preparation Ability  [x] Independent   [] Standby Assistance   [] Minimal Assistance   [] Moderate Assistance  [] Maximum Assistance     [] Total Assistance   Chores  [x] Independent with Chores   [] N/A Nursing Home Resident   [] Requires Assistance   Bowel/Bladder  [x] Continent  [] Catheter  [] Incontinent  [] Ostomy Self-Care    [] Urine Diversion Self-Care  [] Maximum Assistance     [] Total Assistance   Number of Persons needed for assistance    DME at home  [] Bhanu [place]  [] Holly [place]   [] Commode    [] Bathroom/Grab Bars  [] Hospital Bed  [] Nebulizer  [] Oxygen           [] Raised Toilet Seat  [] Shower Chair  [] Side Rails for Bed   [] Tub Transfer Bench   [] Chapito Room  [] Charley Bingham, Standard      [] Other:   Vendor      Treatment Presently Receiving:    Current Treatments  [] Chemotherapy  [] Dialysis  [] Insulin  [] IVAB [x] IVF   [] O2  [] PCA   [] PT   [] RT   [] Tube Feedings   [] Wound Care     Psychosocial Evaluation:    Verbalized Knowledge of Disease Process  [] Patient  []Family   Coping with Disease Process  [] Patient  []Family   Requires Further Counseling Coping with Disease Process  [] Patient  []Family     Identified Projected Needs:    Home Health Aid  [] Yes  [x]No   Transportation  [] Yes  [x]No   Education  [] Yes  [x]No        Specific Education     Financial Counseling  [] Yes  [x]No   Inability to Care for Self/Will Require 24 hour care  [] Yes  [x]No   Pain Management  [] Yes  [x]No   Home Infusion Therapy  [] Yes  [x]No   Oxygen Therapy  [] Yes  [x]No   DME  [] Yes  [x]No   Long Term Care Placement  [] Yes  [x]No   Rehab  [] Yes  [x]No   Physical Therapy  [] Yes  [x]No   Needs Anticipated At This Time  [] Yes  [x]No Intra-Hospital Referral:    [address]  [] Yes  [x]No     [] Yes  [x]No   Patient Representative  [] Yes  [x]No   Staff for Teaching Needs  [] Yes  [x]No   Specialty Teaching Needs     Diabetic Educator  [] Yes  [x]No   Referral for Diabetic Educator Needed  [] Yes  [x]No  If Yes, place order for Nutritionist or Diabetic Consult     Tentative Discharge Plan:    Home with No Services  [x] Yes  []No   Home with [address]  [] Yes  [x]No        If Yes, specify type    Home Care Program  [] Yes  [x]No        If Yes, specify type    Meals on Wheels  [] Yes  [x]No   Office of Aging  [] Yes  [x]No   NHP  [] Yes  [x]No   Return to the Nursing Home  [] Yes  [x]No   Rehab Therapy  [] Yes  [x]No   Acute Rehab  [] Yes  [x]No   Subacute Rehab  [] Yes  [x]No   Private Care  [] Yes  [x]No   Substance Abuse Referral  [] Yes  [x]No   Transportation  [] Yes  [x]No   Chore Service  [] Yes  [x]No   Inpatient Hospice  [] Yes  [x]No   OP RT  [] Yes  [x] No   OP Hemo  [] Yes  [x] No   OP PT  [] Yes  [x]No   Support Group  [] Yes  [x]No   Reach to Recovery  [] Yes  [x]No   OP Oncology Clinic  [] Yes  [x]No   Clinic Appointment  [] Yes  [x]No   DME  [] Yes  [x]No   Comments    Name of D/C Planner or  Given to Patient or Family Janine Francobabatunde. Jacky Gallo RN   Phone Number         [phone]   Date 10/14/17   Time    If you are discharged home, whom do you designate to participate in your discharge plan and receive any information needed?      Enter name of designee wife        Phone # of designee         Address of designee         Updated         Patient refused to designate any           individual

## 2017-10-14 NOTE — PROGRESS NOTES
Patient received at beginning of shift from [facility] RN, Raimundo, in bed, awake. Pt does not appear to be in distress and denies any pain and/or discomfort. Safety measures take include bed in lowest locked position, call bell within reach, and personal items at bedside within reach. Pt verbalizes understanding of use of call bell and the need to call for assistance to ensure safety. 1910: Bedside and Verbal shift change report given to Nikki RUSH (oncoming nurse) by Bertram Carrington RN (offgoing nurse). Report included the following information SBAR, Kardex, Intake/Output, MAR and Recent Results.

## 2017-10-14 NOTE — PROGRESS NOTES
Problem: Falls - Risk of  Goal: *Absence of Falls  Document Florentino Fall Risk and appropriate interventions in the flowsheet.    Outcome: Progressing Towards Goal  Fall Risk Interventions:

## 2017-10-15 VITALS
HEIGHT: 71 IN | BODY MASS INDEX: 31.89 KG/M2 | OXYGEN SATURATION: 92 % | DIASTOLIC BLOOD PRESSURE: 81 MMHG | TEMPERATURE: 97.6 F | RESPIRATION RATE: 16 BRPM | SYSTOLIC BLOOD PRESSURE: 131 MMHG | HEART RATE: 64 BPM | WEIGHT: 227.8 LBS

## 2017-10-15 LAB
BASOPHILS # BLD: 0 K/UL (ref 0–0.06)
BASOPHILS NFR BLD: 0 % (ref 0–2)
DIFFERENTIAL METHOD BLD: ABNORMAL
EOSINOPHIL # BLD: 0.2 K/UL (ref 0–0.4)
EOSINOPHIL NFR BLD: 2 % (ref 0–5)
ERYTHROCYTE [DISTWIDTH] IN BLOOD BY AUTOMATED COUNT: 12.3 % (ref 11.6–14.5)
HCT VFR BLD AUTO: 41.4 % (ref 36–48)
HGB BLD-MCNC: 13.8 G/DL (ref 13–16)
LYMPHOCYTES # BLD: 2 K/UL (ref 0.9–3.6)
LYMPHOCYTES NFR BLD: 22 % (ref 21–52)
MCH RBC QN AUTO: 30.9 PG (ref 24–34)
MCHC RBC AUTO-ENTMCNC: 33.3 G/DL (ref 31–37)
MCV RBC AUTO: 92.8 FL (ref 74–97)
MONOCYTES # BLD: 0.9 K/UL (ref 0.05–1.2)
MONOCYTES NFR BLD: 10 % (ref 3–10)
NEUTS SEG # BLD: 5.8 K/UL (ref 1.8–8)
NEUTS SEG NFR BLD: 66 % (ref 40–73)
PLATELET # BLD AUTO: 236 K/UL (ref 135–420)
PMV BLD AUTO: 10.7 FL (ref 9.2–11.8)
RBC # BLD AUTO: 4.46 M/UL (ref 4.7–5.5)
WBC # BLD AUTO: 9 K/UL (ref 4.6–13.2)

## 2017-10-15 PROCEDURE — 85025 COMPLETE CBC W/AUTO DIFF WBC: CPT | Performed by: INTERNAL MEDICINE

## 2017-10-15 PROCEDURE — 36415 COLL VENOUS BLD VENIPUNCTURE: CPT | Performed by: INTERNAL MEDICINE

## 2017-10-15 PROCEDURE — 74011000258 HC RX REV CODE- 258: Performed by: FAMILY MEDICINE

## 2017-10-15 PROCEDURE — 74011250636 HC RX REV CODE- 250/636: Performed by: FAMILY MEDICINE

## 2017-10-15 RX ORDER — METRONIDAZOLE 500 MG/1
500 TABLET ORAL 2 TIMES DAILY
Qty: 20 TAB | Refills: 0 | Status: SHIPPED | OUTPATIENT
Start: 2017-10-15 | End: 2017-10-25

## 2017-10-15 RX ORDER — SODIUM CHLORIDE 900 MG/100ML
INJECTION INTRAVENOUS
Status: DISPENSED
Start: 2017-10-15 | End: 2017-10-15

## 2017-10-15 RX ORDER — CIPROFLOXACIN 500 MG/1
500 TABLET ORAL 2 TIMES DAILY
Qty: 20 TAB | Refills: 0 | Status: SHIPPED | OUTPATIENT
Start: 2017-10-15 | End: 2017-10-25

## 2017-10-15 RX ORDER — PIPERACILLIN SODIUM, TAZOBACTAM SODIUM 4; .5 G/20ML; G/20ML
INJECTION, POWDER, LYOPHILIZED, FOR SOLUTION INTRAVENOUS
Status: DISPENSED
Start: 2017-10-15 | End: 2017-10-15

## 2017-10-15 RX ADMIN — PIPERACILLIN SODIUM,TAZOBACTAM SODIUM 4.5 G: 4; .5 INJECTION, POWDER, FOR SOLUTION INTRAVENOUS at 15:04

## 2017-10-15 RX ADMIN — HEPARIN SODIUM 5000 UNITS: 5000 INJECTION, SOLUTION INTRAVENOUS; SUBCUTANEOUS at 06:29

## 2017-10-15 RX ADMIN — PIPERACILLIN SODIUM,TAZOBACTAM SODIUM 4.5 G: 4; .5 INJECTION, POWDER, FOR SOLUTION INTRAVENOUS at 06:29

## 2017-10-15 NOTE — PROGRESS NOTES
Patient received at beginning of shift from [facility] RN, Raimundo, in bed, awake. Pt does not appear to be in distress and denies any pain and/or discomfort. Safety measures take include bed in lowest locked position, call bell within reach, and personal items at bedside within reach. Pt verbalizes understanding of use of call bell and the need to call for assistance to ensure safety. 1245: Discharge orders placed. Dr. Metcalf wants to discharge patient after 1500 dose of antibiotics. 1905: Patient discharged. Taken down w/ CNA to car.

## 2017-10-15 NOTE — PROGRESS NOTES
1910: Bedside verbal shift report received from Gianni, [facility]. Pt is awake in bed, no signs of distress, instructed to press call light if assistance is needed, call light within reach. Uneventful night. Pt had no complaints of pain, rested throughout the night. 6225: Bedside and Verbal shift change report given to ADRI Archer (oncoming nurse) by [facility], RN (offgoing nurse). Report included the following information SBAR, Kardex, Intake/Output, MAR and Recent Results.

## 2017-10-15 NOTE — DISCHARGE INSTRUCTIONS
Patient armband removed and shredded. DISCHARGE SUMMARY from Nurse    The following personal items are in your possession at time of discharge:    Dental Appliances: None  Visual Aid: None     Home Medications: None  Jewelry: None  Clothing: Shirt  Other Valuables: Cell Phone             PATIENT INSTRUCTIONS:    After general anesthesia or intravenous sedation, for 24 hours or while taking prescription Narcotics:  · Limit your activities  · Do not drive and operate hazardous machinery  · Do not make important personal or business decisions  · Do  not drink alcoholic beverages  · If you have not urinated within 8 hours after discharge, please contact your surgeon on call. Report the following to your surgeon:  · Excessive pain, swelling, redness or odor of or around the surgical area  · Temperature over 100.5  · Nausea and vomiting lasting longer than 4 hours or if unable to take medications  · Any signs of decreased circulation or nerve impairment to extremity: change in color, persistent  numbness, tingling, coldness or increase pain  · Any questions        What to do at Home:  Recommended activity: Activity as tolerated. If you experience any of the following symptoms listed under \"When to call for Help\", please follow up with your PCP or call 911. *  Please give a list of your current medications to your Primary Care Provider. *  Please update this list whenever your medications are discontinued, doses are      changed, or new medications (including over-the-counter products) are added. *  Please carry medication information at all times in case of emergency situations. These are general instructions for a healthy lifestyle:    No smoking/ No tobacco products/ Avoid exposure to second hand smoke    Surgeon General's Warning:  Quitting smoking now greatly reduces serious risk to your health.     Obesity, smoking, and sedentary lifestyle greatly increases your risk for illness    A healthy diet, regular physical exercise & weight monitoring are important for maintaining a healthy lifestyle    You may be retaining fluid if you have a history of heart failure or if you experience any of the following symptoms:  Weight gain of 3 pounds or more overnight or 5 pounds in a week, increased swelling in our hands or feet or shortness of breath while lying flat in bed. Please call your doctor as soon as you notice any of these symptoms; do not wait until your next office visit. Recognize signs and symptoms of STROKE:    F-face looks uneven    A-arms unable to move or move unevenly    S-speech slurred or non-existent    T-time-call 911 as soon as signs and symptoms begin-DO NOT go       Back to bed or wait to see if you get better-TIME IS BRAIN. Warning Signs of HEART ATTACK     Call 911 if you have these symptoms:   Chest discomfort. Most heart attacks involve discomfort in the center of the chest that lasts more than a few minutes, or that goes away and comes back. It can feel like uncomfortable pressure, squeezing, fullness, or pain.  Discomfort in other areas of the upper body. Symptoms can include pain or discomfort in one or both arms, the back, neck, jaw, or stomach.  Shortness of breath with or without chest discomfort.  Other signs may include breaking out in a cold sweat, nausea, or lightheadedness. Don't wait more than five minutes to call 911 - MINUTES MATTER! Fast action can save your life. Calling 911 is almost always the fastest way to get lifesaving treatment. Emergency Medical Services staff can begin treatment when they arrive -- up to an hour sooner than if someone gets to the hospital by car. The discharge information has been reviewed with the patient. The patient verbalized understanding. Discharge medications reviewed with the patient and appropriate educational materials and side effects teaching were provided.             Learning About Diverticulosis and Diverticulitis  What are diverticulosis and diverticulitis? In diverticulosis and diverticulitis, pouches called diverticula form in the wall of the large intestine, or colon. · In diverticulosis, the pouches do not cause any pain or other symptoms. · In diverticulitis, the pouches get inflamed or infected and cause symptoms. Doctors aren't sure what causes these pouches in the colon. But they think that a low-fiber diet may play a role. Without fiber to add bulk to the stool, the colon has to work harder than normal to push the stool forward. The pressure from this may cause pouches to form in weak spots along the colon. Some people with diverticulosis get diverticulitis. But experts don't know why this happens. What are the symptoms? · In diverticulosis, most people don't have symptoms. But pouches sometimes bleed. · In diverticulitis, symptoms may last from a few hours to a week or more. They include:  ¨ Belly pain. This is usually in the lower left side. It is sometimes worse when you move. This is the most common symptom. ¨ Fever and chills. ¨ Bloating and gas. ¨ Diarrhea or constipation. ¨ Nausea and sometimes vomiting. ¨ Not feeling like eating. How can you prevent these problems? You may be able to lower your chance of getting diverticulitis. You can do this by taking steps to prevent constipation. · Eat fruits, vegetables, beans, and whole grains every day. These foods are high in fiber. · Drink plenty of fluids (enough so that your urine is light yellow or clear like water). If you have kidney, heart, or liver disease and have to limit fluids, talk with your doctor before you increase the amount of fluids you drink. · Get at least 30 minutes of exercise on most days of the week. Walking is a good choice. You also may want to do other activities, such as running, swimming, cycling, or playing tennis or team sports.   · Take a fiber supplement, such as Citrucel or Metamucil, every day if needed. Read and follow all instructions on the label. · Schedule time each day for a bowel movement. Having a daily routine may help. Take your time and do not strain when having a bowel movement. Some people avoid nuts, seeds, berries, and popcorn. They believe that these foods might get trapped in the diverticula and cause pain. But there is no proof that these foods cause diverticulitis or make it worse. How are these problems treated? · The best way to treat diverticulosis is to avoid constipation. (See the tips above.)  · Treatment for diverticulitis includes antibiotics and often a change in your diet. You may need only liquids at first. Your doctor may suggest pain medicines for pain or belly cramps. In some cases, surgery may be needed. Follow-up care is a key part of your treatment and safety. Be sure to make and go to all appointments, and call your doctor if you are having problems. It's also a good idea to know your test results and keep a list of the medicines you take. Where can you learn more? Go to http://geoff-efrem.info/. Enter V091 in the search box to learn more about \"Learning About Diverticulosis and Diverticulitis. \"  Current as of: August 9, 2016  Content Version: 11.3  © 6963-4217 [employer]. Care instructions adapted under license by [employer] (which disclaims liability or warranty for this information). If you have questions about a medical condition or this instruction, always ask your healthcare professional. [place] [address] any warranty or liability for your use of this information. [employer] Activation    Thank you for requesting access to [employer]. Please follow the instructions below to securely access and download your online medical record. [employer] allows you to send messages to your doctor, view your test results, renew your prescriptions, schedule appointments, and more. How Do I Sign Up? 1.  In your internet browser, go to [email or web address]  2. Click on the First Time User? Click Here link in the Sign In box. You will be redirect to the New Member Sign Up page. 3. Enter your "[employer] Access Code exactly as it appears below. You will not need to use this code after youve completed the sign-up process. If you do not sign up before the expiration date, you must request a new code. "[employer] Access Code: [email or web address]  Expires: 2018  8:05 PM (This is the date your "[employer] access code will )    4. Enter the last four digits of your Social Security Number (xxxx) and Date of Birth (mm/dd/yyyy) as indicated and click Submit. You will be taken to the next sign-up page. 5. Create a "[employer] ID. This will be your "[employer] login ID and cannot be changed, so think of one that is secure and easy to remember. 6. Create a "[employer] password. You can change your password at any time. 7. Enter your Password Reset Question and Answer. This can be used at a later time if you forget your password. 8. Enter your e-mail address. You will receive e-mail notification when new information is available in [address] 9. Click Sign Up. You can now view and download portions of your medical record. 10. Click the Download Summary menu link to download a portable copy of your medical information. Additional Information    If you have questions, please visit the Frequently Asked Questions section of the "[employer] website at [email or web address] Remember, "[employer] is NOT to be used for urgent needs. For medical emergencies, dial 911.

## 2017-10-15 NOTE — PROGRESS NOTES
PROGRESS NOTE   PATIENT:  Jana Demarco           MRN: [id number]           [facility], [address]           10/15/2017, 12:39 PM      I  Mr. Angeles Hernandez is a 47 y.o. male who is being seen for Acute sigmoid diverticulitis with peridiverticular abscess    SUBJECTIVE:  Pt has mild lower abdominal discomfort. No nausea, vomiting, . Had a few loose stools. No blood in stool     OBJECTIVE:  Patient Vitals for the past 24 hrs:   Temp Pulse Resp BP SpO2   10/15/17 0611 97.6 °F (36.4 °C) 64 16 131/81 92 %   10/14/17 1827 98.7 °F (37.1 °C) 71 16 125/65 94 %         Intake/Output Summary (Last 24 hours) at 10/15/17 1239  Last data filed at 10/15/17 0659   Gross per 24 hour   Intake          2496.67 ml   Output                0 ml   Net          2496.67 ml       Gen: NAD  Heent: No pallor, icterus  Lungs: Clear B/L   CVS exam: Regular rate and rhythm   Abd  : Soft, non tender, BS +, No masses felt. Extremities  : No pedal edema B/L   Neuro: Alert, oriented X 3 No asterexis. Labs: Results:   Chemistry Recent Labs      10/14/17   0502  10/13/17   1855   GLU  96  114*   NA  140  137   K  4.0  3.7   CL  106  103   CO2  26  29   BUN  11  11   CREA  0.98  1.00   CA  9.1  9.4   AGAP  8  5   BUCR  11*  11*   AP   --   91   TP   --   6.9   ALB   --   3.9   GLOB   --   3.0   AGRAT   --   1.3    Estimated Creatinine Clearance: 109.1 mL/min (based on Cr of 0.98). CBC w/Diff Recent Labs      10/15/17   0440  10/14/17   0502  10/13/17   1855   WBC  9.0  8.2  8.1   RBC  4.46*  4.18*  4.31*   HGB  13.8  13.0  13.7   HCT  41.4  38.4  39.4   PLT  236  200  219   GRANS  66  68  62   LYMPH  22  19*  26   EOS  2  3  2      Cardiac Enzymes No results for input(s): CPK, CKND1, ANISA in the last 72 hours.     No lab exists for component: CKRMB, TROIP   Coagulation Recent Labs      10/14/17   0502   PTP  13.2   INR  1.1       Hepatitis Panel No results found for: HAMAT, HAAB, HABT, HAAT, HBSAG, HBSB, HBSAT, HBABN, HBCM, HBCAB, HBCAT, Acacia Mckeon, HBEAG, XHEPCS, [id number], HBEGLT, HBCMLT, HBCLT, HBEBLT, [id number], [id number], HAVMLT, [id number], HBCMLT, [id number], HCGAT   Amylase Lipase    Liver Enzymes Recent Labs      10/13/17   1855   TP  6.9   ALB  3.9   TBILI  0.4   AP  91   SGOT  16   ALT  24      Thyroid Studies No results for input(s): T4, T3U, TSH, TSHEXT in the last 72 hours. No lab exists for component: T3RU     Pathology pathology         No Known Allergies    Current Facility-Administered Medications   Medication Dose Route Frequency    piperacillin-tazobactam (ZOSYN) 4.5 gram injection        0.9% sodium chloride (MBP/ADV) 0.9 % infusion        sodium chloride (NS) flush 5-10 mL  5-10 mL IntraVENous PRN    heparin (porcine) injection 5,000 Units  5,000 Units SubCUTAneous Q8H    ondansetron (ZOFRAN) injection 4 mg  4 mg IntraVENous Q4H PRN    morphine injection 2 mg  2 mg IntraVENous Q4H PRN    piperacillin-tazobactam (ZOSYN) 4.5 g in 0.9% sodium chloride (MBP/ADV) 100 mL MBP  4.5 g IntraVENous Q8H    0.9% sodium chloride infusion  100 mL/hr IntraVENous CONTINUOUS       ASSESSMENT:  1. Acute sigmoid diverticulitis with 15 mm peridiverticular  Abscess. On Zosyn   RECOMMENDATIONS: *  1. Cont Zosyn. Can change to oral Abx tomorrow  2. Low residue diet   3. Recommend repeat CT scan in 5-6 days.    4. OK to discharge home late tonight or tomorrow am.   5. Follow up with Dr. Cody Beatty in 1 week     Haritha Delgadillo MD

## 2017-10-15 NOTE — DISCHARGE SUMMARY
[facility]  Hospitalist Division    Discharge Summary      Patient: Anatoliy Goel MRN: [id number]  CSN: [id number]    YOB: 1965  Age: 47 y.o. Sex: male    DOA: 10/13/2017 LOS:  LOS: 2 days   Discharge Date: 10/15/17     PCP:  Oj Chandler MD    Chief Complaint:    Chief Complaint   Patient presents with    Abdominal Pain     Diverticulitis of large intestine with abscess without bleeding    Admission Diagnosis:   Hospital Problems as of 10/15/2017  Never Reviewed          Codes Class Noted - Resolved POA    * (Principal)Diverticulitis of large intestine with abscess without bleeding ICD-10-CM: K57.20  ICD-9-CM: 562.11, 569.5  10/13/2017 - Present Yes              Discharge Diagnoses:    Diverticulitis with abscess - Continue Zosyn , IVF and pain control as needed. GI to consult. Planning on non-surgical management. Interval CT abd will be needed  Currently on clears  Sinus Bradycardia -asymptomatic. Hospital Course:   31N with hx of diverticular disease presents with abd pain and fever x5d. Found to have a 1.5cm tano-diverticular abscess. Started on IV Zosyn, GI consulted. Has had loose BMs and tolerating liquid diet. Afebrile. He will complete IV Zosyn until tonight and then complete a total 10 day course of Ciprofloxacin and flagyl (failed augmentin prior). He will setup a CT scan of his abdomen next week and follow-up with Dr Carol Tineo for a Colonoscopy.     Significant Diagnostic Studies:  Blood Cx x2  CT abd - outside facility    Consults:  Treatment Team: Attending Provider: Cornelia House DO; Consulting Provider: Aparna Carter MD; Consulting Provider: Thierry Simmons MD    Operative Procedures:  N/A    Disposition:  Home    Diet:  Cardiac    Discharge Condition:   Good    Discharge Medications:    Current Discharge Medication List      START taking these medications    Details   ciprofloxacin HCl (CIPRO) 500 mg tablet Take 1 Tab by mouth two (2) times a day for 10 days. Qty: 20 Tab, Refills: 0      metroNIDAZOLE (FLAGYL) 500 mg tablet Take 1 Tab by mouth two (2) times a day for 10 days. Qty: 20 Tab, Refills: 0             Recent Results (from the past 24 hour(s))   CBC WITH AUTOMATED DIFF    Collection Time: 10/15/17  4:40 AM   Result Value Ref Range    WBC 9.0 4.6 - 13.2 K/uL    RBC 4.46 (L) 4.70 - 5.50 M/uL    HGB 13.8 13.0 - 16.0 g/dL    HCT 41.4 36.0 - 48.0 %    MCV 92.8 74.0 - 97.0 FL    MCH 30.9 24.0 - 34.0 PG    MCHC 33.3 31.0 - 37.0 g/dL    RDW 12.3 11.6 - 14.5 %    PLATELET 102 988 - 014 K/uL    MPV 10.7 9.2 - 11.8 FL    NEUTROPHILS 66 40 - 73 %    LYMPHOCYTES 22 21 - 52 %    MONOCYTES 10 3 - 10 %    EOSINOPHILS 2 0 - 5 %    BASOPHILS 0 0 - 2 %    ABS. NEUTROPHILS 5.8 1.8 - 8.0 K/UL    ABS. LYMPHOCYTES 2.0 0.9 - 3.6 K/UL    ABS. MONOCYTES 0.9 0.05 - 1.2 K/UL    ABS. EOSINOPHILS 0.2 0.0 - 0.4 K/UL    ABS. BASOPHILS 0.0 0.0 - 0.06 K/UL    DF AUTOMATED         Follow-Up And Discharge Instructions: Follow-up Information     Follow up With Details Comments Contact Info Additional Information    Dale Samaniego MD Schedule an appointment as soon as possible for a visit CT abd 10/19. Colonoscopy 6-8 weeks [address]       [address]        [address] [address] INSTRUCTIONS  For Diagnostic Radiology/XRAY exams:  Check in to Patient Access at the [address] 30 minutes prior to your appointment.  NOTE:  FREE  Parking is available at the [address] [place] [address]                                                               [id number] [place] [address] Wound Care/Supplies:   N/A      Dr [facility]        Time Spent:  35min    Cc: Tea Rivera MD

## 2017-10-15 NOTE — PROGRESS NOTES
Daily Progress Note: 10/15/2017 8:40 AM   Admit Date: 10/13/2017    Patient seen in follow up for multiple medical problems as listed below:  Patient Active Problem List   Diagnosis Code    Diverticulitis of large intestine with abscess without bleeding K57.20       Assesment     52M with hx of diverticular disease presents with abd pain and fever x5d. Found to have a 1.5cm peridiverticular abscess. Started on IV Zosyn, GI consulted. Has had loose BMs and tolerating liquid diet. Afebrile. Diverticulitis with abscess - Continue Zosyn , IVF and pain control as needed. GI to consult. Planning on non-surgical management. Interval CT abd will be needed  Currently on clears     Sinus Bradycardia -asymptomatic. DVT Protocol Active: yes  Code Status:  Full Code     Disposition: discharge pending    Subjective:     CC: Abdominal Pain    Interval History: patient well on Full liquid. Still slight RLQ tenderness. Pending GI evaluation will plan for discharge on cipro/flagyl tonight with GI follow-up and CT abd Next Thursday,later colonoscopy. ROS: 11 point ROS negative except for    Objective:     Visit Vitals    /81    Pulse 64    Temp 97.6 °F (36.4 °C)    Resp 16    Ht 5' 11\" (1.803 m)    Wt 103.3 kg (227 lb 12.8 oz)    SpO2 92%    BMI 31.77 kg/m2       Temp (24hrs), Av.2 °F (36.8 °C), Min:97.6 °F (36.4 °C), Max:98.7 °F (37.1 °C)        Intake/Output Summary (Last 24 hours) at 10/15/17 0750  Last data filed at 10/15/17 0659   Gross per 24 hour   Intake          2596.67 ml   Output                0 ml   Net          2596.67 ml       Gen: AOx3, NAD  HEENT:  GRIFFIN, EOMI. Neck: No Bruits/JVD   Lungs:   CTAB. Good respiratory effort  Heart:   RR S1 S2 without M/R/G  Abdomen: ND,mild RLQ tenderness, BSX4,   Extremities:   No LE edema. No cyanosis.   Skin:  no jaundice/lesions      Data Review:     Meds/Labs/Tests reviewed    Current Shift:     Last three shifts:  10/13 1901 - 10/15 0700  In: [id number] [P.O.:200;  I.V.:3295]  Out: -   Recent Labs      10/15/17   0440  10/14/17   0502  10/13/17   1855   WBC  9.0  8.2  8.1   RBC  4.46*  4.18*  4.31*   HGB  13.8  13.0  13.7   HCT  41.4  38.4  39.4   PLT  236  200  219   GRANS  66  68  62   LYMPH  22  19*  26   EOS  2  3  2       Recent Labs      10/14/17   0502  10/13/17   1855   BUN  11  11   CREA  0.98  1.00   CA  9.1  9.4   ALB   --   3.9   K  4.0  3.7   NA  140  137   CL  106  103   CO2  26  29   GLU  96  114*        Lab Results   Component Value Date/Time    Glucose 96 10/14/2017 05:02 AM    Glucose 114 10/13/2017 06:55 PM          Care coordination with Nursing/Consultants/staff: 15  Prior history, labs, and charting reviewed: 15    Procedures/Imaging:  Blood Cx x2  CT abd - outside facility      Total time spent with chart review, patient examination/education, discussion with staff on case,documentation and medication management / adjustment  :  30 Minutes      Dr Charlene Angelo  [phone]

## 2017-10-19 LAB
BACTERIA SPEC CULT: NORMAL
BACTERIA SPEC CULT: NORMAL
SERVICE CMNT-IMP: NORMAL
SERVICE CMNT-IMP: NORMAL